# Patient Record
Sex: MALE | Race: WHITE | Employment: FULL TIME | URBAN - METROPOLITAN AREA
[De-identification: names, ages, dates, MRNs, and addresses within clinical notes are randomized per-mention and may not be internally consistent; named-entity substitution may affect disease eponyms.]

---

## 2017-12-22 PROBLEM — G47.33 OSA (OBSTRUCTIVE SLEEP APNEA): Status: ACTIVE | Noted: 2017-12-22

## 2019-12-11 PROCEDURE — 88305 TISSUE EXAM BY PATHOLOGIST: CPT | Performed by: INTERNAL MEDICINE

## 2020-02-25 PROBLEM — I70.0 AORTIC ATHEROSCLEROSIS (HCC): Status: ACTIVE | Noted: 2020-02-25

## 2020-02-25 PROBLEM — I70.0 AORTIC ATHEROSCLEROSIS: Status: ACTIVE | Noted: 2020-02-25

## 2020-10-01 PROBLEM — G89.29 CHRONIC RIGHT HIP PAIN: Status: ACTIVE | Noted: 2020-10-01

## 2020-10-01 PROBLEM — S73.191A TEAR OF RIGHT ACETABULAR LABRUM, INITIAL ENCOUNTER: Status: ACTIVE | Noted: 2020-10-01

## 2020-10-01 PROBLEM — M25.551 CHRONIC RIGHT HIP PAIN: Status: ACTIVE | Noted: 2020-10-01

## 2020-10-09 PROBLEM — R73.03 PREDIABETES: Status: ACTIVE | Noted: 2020-10-09

## 2021-09-29 PROBLEM — I10 ACCELERATED HYPERTENSION: Status: ACTIVE | Noted: 2021-09-29

## 2021-12-07 PROBLEM — Z28.21 REFUSED PNEUMOCOCCAL VACCINATION: Status: ACTIVE | Noted: 2021-12-07

## 2021-12-07 PROBLEM — N18.31 STAGE 3A CHRONIC KIDNEY DISEASE (HCC): Status: ACTIVE | Noted: 2021-12-07

## 2024-02-07 ENCOUNTER — HOSPITAL ENCOUNTER (OUTPATIENT)
Dept: GENERAL RADIOLOGY | Facility: HOSPITAL | Age: 69
Discharge: HOME | End: 2024-02-07
Attending: ORTHOPAEDIC SURGERY
Payer: MEDICARE

## 2024-02-07 ENCOUNTER — OFFICE VISIT (OUTPATIENT)
Dept: ORTHOPEDICS CLINIC | Facility: CLINIC | Age: 69
End: 2024-02-07
Payer: MEDICARE

## 2024-02-07 VITALS — WEIGHT: 236.63 LBS | BODY MASS INDEX: 32.05 KG/M2 | HEIGHT: 72 IN

## 2024-02-07 DIAGNOSIS — M25.559 HIP PAIN, UNSPECIFIED LATERALITY: ICD-10-CM

## 2024-02-07 DIAGNOSIS — M16.11 PRIMARY OSTEOARTHRITIS OF RIGHT HIP: ICD-10-CM

## 2024-02-07 DIAGNOSIS — M25.559 HIP PAIN, UNSPECIFIED LATERALITY: Primary | ICD-10-CM

## 2024-02-07 PROCEDURE — 99204 OFFICE O/P NEW MOD 45 MIN: CPT | Performed by: ORTHOPAEDIC SURGERY

## 2024-02-07 PROCEDURE — 73502 X-RAY EXAM HIP UNI 2-3 VIEWS: CPT | Performed by: ORTHOPAEDIC SURGERY

## 2024-02-07 RX ORDER — MELOXICAM 15 MG/1
15 TABLET ORAL DAILY
Qty: 30 TABLET | Refills: 0 | Status: SHIPPED | OUTPATIENT
Start: 2024-02-07

## 2024-02-07 NOTE — PROGRESS NOTES
NURSING INTAKE COMMENTS:   Chief Complaint   Patient presents with    Hip Pain     New pt - R hip - onset- 5 yrs ago- denies injury- rates pain 5-6/10 on and off- here for 2nd opinion        HPI: This 68 year old male presents today with complaints of pain in the right hip.  Has had the pain for several years.  It interferes with his quality of life.  He walks his dog about half a mile at night.  He finds that he does not walk as far as he would like because his hip hurts.  He has used meloxicam in the past with good relief.  He is tried turmeric recently without success.  He has had 2 cortisone's, 1 in the anterior aspect of the hip which he thinks helped, and the second on the lateral aspect of the hip which she thinks did not help.  He saw another surgeon who told him that if he had a hip replacement he would not have any pain.  He wanted to get a second opinion.  He would like to try conservative treatment if appropriate.    Past Medical History:   Diagnosis Date    High blood pressure     High cholesterol     HYPERLIPIDEMIA     Obstructive sleep apnea syndrome PSG 9/27/15    AHI 26 REM AHI 48 O2 [place] 84%    OTHER DISEASES     congenital hand defect     Past Surgical History:   Procedure Laterality Date    COLONOSCOPY  2004?    colon polyps    COLONOSCOPY,BIOPSY  10/5/2011    Performed by CORY RODRIGUEZ at [facility] [place], [facility]    EXCIS TENDON SHEATH CHIKIS ROGERS/SOLANGE  11/13/09    Performed by LEVAR HARRELL at [facility], [facility]    REMOVAL OF LUNG,LOBECTOMY  2016    nodule removed      Current Outpatient Medications   Medication Sig Dispense Refill    Meloxicam 15 MG Oral Tab Take 1 tablet (15 mg total) by mouth daily. 30 tablet 0    VALSARTAN 160 MG Oral Tab TAKE ONE TABLET BY MOUTH ONE TIME DAILY 90 tablet 0    ATORVASTATIN 20 MG Oral Tab TAKE ONE TABLET BY MOUTH ONE TIME DAILY  90 tablet 3    aspirin 81 MG Oral Tab Take 81 mg by mouth daily.      Cholecalciferol (VITAMIN D) 2000 UNITS Oral Cap  Take 2,000 Units by mouth daily.       No Known Allergies  Family History   Problem Relation Age of Onset    Cancer Father     Other (Other) Mother         MS    Lipids Maternal Grandfather     Heart Disorder Maternal Grandfather         cad    Cancer Sister         brain tumor age 42       Social History     Occupational History    Not on file   Tobacco Use    Smoking status: Former     Packs/day: 1.00     Years: 45.00     Additional pack years: 0.00     Total pack years: 45.00     Types: Cigarettes     Quit date: 2015     Years since quittin.4    Smokeless tobacco: Never    Tobacco comments:     9/1/15 - hasn't smoked for a few days   Vaping Use    Vaping Use: Never used   Substance and Sexual Activity    Alcohol use: Yes     Alcohol/week: 3.0 standard drinks of alcohol     Types: 3 Cans of beer per week     Comment: occ 2 beers a week     Drug use: Yes     Types: Cannabis     Comment: marijuana once a month, smks     Sexual activity: Not Currently        Review of Systems:  GENERAL: feels generally well, no significant weight loss or weight gain  SKIN: no ulcerated or worrisome skin lesions  EYES:denies blurred vision or double vision  HEENT: denies new nasal congestion, sinus pain or ST  LUNGS: denies shortness of breath  CARDIOVASCULAR: denies chest pain  GI: no hematemesis, no worsening heartburn, no diarrhea  : no dysuria, no blood in urine, no difficulty urinating, no incontinence  MUSCULOSKELETAL: no other musculoskeletal complaints other than in HPI  NEURO: no numbness or tingling, no weakness or balance disorder  PSYCHE: no depression or anxiety  HEMATOLOGIC: no hx of blood dyscrasia  ENDOCRINE: no thyroid or diabetes issues  ALL/ASTHMA: no new hx of severe allergy or asthma    Physical Examination:    Ht 6' (1.829 m)   Wt 236 lb 9.6 oz (107.3 kg)   BMI 32.09 kg/m²   Constitutional: appears well hydrated, alert and responsive, no acute distress noted  Extremities: Normal gait.  Slightly  positive Stinchfield test on the right.  Full range of motion.      Imaging: Moderate osteoarthritis of the right hip with joint space narrowing and osteophyte formation..     Lab Results   Component Value Date    WBC 4.65 12/07/2021    HGB 15.6 12/07/2021     12/07/2021      Lab Results   Component Value Date     (H) 02/10/2022    BUN 18.0 02/10/2022    CREATSERUM 1.23 (H) 02/10/2022    GFR 58 (L) 12/04/2008        Assessment and Plan:  Diagnoses and all orders for this visit:    Hip pain, unspecified laterality  -     XR HIP W OR WO PELVIS 2 OR 3 VIEWS, RIGHT (CPT=73502); Future    Primary osteoarthritis of right hip  -     PHYSICAL THERAPY - INTERNAL  -     XR ASPIR/INJ MAJOR JOINT W/FLUORO RT(CPT=77002/09496); Future    Other orders  -     Meloxicam 15 MG Oral Tab; Take 1 tablet (15 mg total) by mouth daily.        Assessment: He has moderate osteoarthritis of the right hip.  I outlined a conservative treatment program for him which she will try prior to consideration of total hip arthroplasty.    Plan: Meloxicam, physical therapy, cortisone injection in the right hip, and I recommended weight reduction and suggested he download the weight watchers leonardo.  He will follow-up with me as needed.    The above note was creating using Dragon speech recognition technology. Please excuse any typos.    RONA BOYLE MD

## 2024-02-21 ENCOUNTER — TELEPHONE (OUTPATIENT)
Dept: PHYSICAL THERAPY | Facility: HOSPITAL | Age: 69
End: 2024-02-21

## 2024-02-26 ENCOUNTER — OFFICE VISIT (OUTPATIENT)
Dept: PHYSICAL THERAPY | Age: 69
End: 2024-02-26
Attending: ORTHOPAEDIC SURGERY
Payer: MEDICARE

## 2024-02-26 DIAGNOSIS — M16.11 PRIMARY OSTEOARTHRITIS OF RIGHT HIP: Primary | ICD-10-CM

## 2024-02-26 PROCEDURE — 97110 THERAPEUTIC EXERCISES: CPT | Performed by: PHYSICAL THERAPIST

## 2024-02-26 PROCEDURE — 97162 PT EVAL MOD COMPLEX 30 MIN: CPT | Performed by: PHYSICAL THERAPIST

## 2024-02-26 NOTE — PROGRESS NOTES
LOWER EXTREMITY EVALUATION:     Diagnosis:   Primary osteoarthritis of right hip (M16.11)       Referring Provider: Yaquelin  Date of Evaluation:    2/26/2024    Precautions:  None Next MD visit:   none scheduled  Date of Surgery: n/a     PATIENT SUMMARY   Ronnie Ordonez is a 68 year old male who presents to therapy today with complaints of R hip pain for the last few years - 10years and has been worsening.    said there was a labral tear R from an MRI taken through [facility] 3 years ago. No catching or clicking at the hip.  States does have clicking at the knee and sometimes will give out .  States taking MELOXICAM which helps in the day but nothing at night.  Getting a cortisone shot tomorrow.  Has had 2 shots in the R hip in the past - 2-3 years- one helped, on didn't.   Pt describes pain level current 1/10, at best 0/10, at worst 7/10 - mornings, walking up to 5-6 blocks.   Works part time -  - light to mod weight.   does feels some back pain when leaning to do dishes of getting something from low on the ground.   Current functional limitations include walking > 5-6 blocks, walking in the yard, first thing in morning - wakes up about 6 hours.., doing dishes some L side back pain/lifting from ground.     Ronnie describes prior level of function some limitation with walking starting 6 years ago and has worsened.  . Pt goals include 1. Get rid of the pain, walk further without the pain limiting him.  .  Past medical history was reviewed with Ronnie. Significant findings include nodule removed lung, meniscus tear surgery L - 7 years ago    ASSESSMENT  Ronnie presents to physical therapy evaluation with primary c/o R hip pain. The results of the objective tests and measures show impaired ROM R to L hip ER/IR, impaired glut strength R to L leg, noted less stability during single leg stance R to L, antalgic gait pattern. Impaired flexibility HS/gastrocs bilaterally and c/o pain.  Functional deficits  include but are not limited to walking > 5-6 blocks, am pain 7/10, doing dishes. .  Pt and PT discussed evaluation findings, pathology, POC and HEP.  Pt voiced understanding and performs HEP correctly without reported pain. Skilled Physical Therapy is medically necessary to address the above impairments and reach functional goals.     OBJECTIVE:   Observation: posture forward shoulder/head, increase thoracic kyphosis,   Palpation: no pain to palpation R greater trochanteric area.  No pain with P/A glides lower lumbar central/R or L.   Sensation: intact  Baseline:  no pain.   Flexion mild LOM - repeated motion - NE  Extension- mod LOM - NE  SBR/L equal bilaterally   ROT R/L some R hip pain with both motions.    AROM: (* denotes performed with pain)  Hip Knee Foot/Ankle   Flexion: R 120; L 120  Extension: R 10; L 10  Abduction: R 4 minus; L 4/5  ER: R 60; L 70  IR: R 20*; L 25 WNL    WNL         Flexibility:  Hip Flexor: R WNL, L WNL  Hamstrings: R -40; L -45  Piriformis: R no restriction ; L no restriction  Quads: R mild restriction; L mild restriction  Gastroc-soleus: R mod restriction ; L mod restriction    Strength/MMT: (* denotes performed with pain)  Hip Knee Foot/Ankle   Flexion: R 5*/5; L 5/5  Extension: R 5/5; L 5/5  Abduction: R 4 minus/5; L 4/5  ER: R 5/5; L 5/5  IR: R 5/5; L 5/5 Flexion: R 5/5; L 5/5  Extension: R 5/5; L 5/5    DF: R 5/5; L 5/5  PF: R 4/5; L 4/5  INV: R 5/5; L 5/5  EV: R 5/5; L 5/5  Great toe ext: R 5/5; L 5/5     Special tests:   (-) Fabres    Gait: pt ambulates on level ground with ER bilat hips during gait, decreased stance time R.  Impaired heel strike bilaterally  Balance: SLS: R 60 noted less stability sec, L 60 sec  Tandem stand L behind 60 sec,   R behind     Today’s Treatment and Response:   Pt education was provided on exam findings, treatment diagnosis, treatment plan, expectations, and prognosis. Patient was instructed in and issued a HEP for: bridges, HS stretch 2nd step,  single leg ER supine, side glut med    Charges: PT Eval Moderate Complexity, Ex 1      Total Timed Treatment: 45 min     Total Treatment Time: 45 min     Based on clinical rationale and outcome measures, this evaluation involved Moderate Complexity decision making due to 1-2 personal factors/comorbidities, 3 body structures involved/activity limitations, and evolving symptoms including changing pain levels.  PLAN OF CARE:    Goals: (to be met in 8-12 visits)  Patient to report pain 1-2/10 or less after walking 1 mile.   Patient to report pain in am to be 4/10 or less.   Patient to be independent with ongoing HEP.   Patient aware of correct body mechanics with daily activities.   Improve LEFS score by 10% or greater.     Frequency / Duration: Patient will be seen for 2 x/week or a total of 8-12 visits over a 90 day period. Treatment will include: Gait training, Neuromuscular Re-education, Therapeutic Exercise, and Home Exercise Program instruction    Education or treatment limitation: None  Rehab Potential:good    LEFS Score  LEFS Score: 47.5 % (2/22/2024  8:18 AM)      Patient was advised of these findings, precautions, and treatment options and has agreed to actively participate in planning and for this course of care.    Thank you for your referral. Please co-sign or sign and return this letter via fax as soon as possible to [phone]. If you have any questions, please contact me at Dept: [phone]    Sincerely,  Electronically signed by therapist: Rina Gunter, PT  Physician's certification required: Yes  I certify the need for these services furnished under this plan of treatment and while under my care.    X___________________________________________________ Date____________________    Certification From: 2/26/2024  To:5/26/2024

## 2024-02-27 ENCOUNTER — HOSPITAL ENCOUNTER (OUTPATIENT)
Dept: GENERAL RADIOLOGY | Facility: HOSPITAL | Age: 69
Discharge: HOME | End: 2024-02-27
Attending: ORTHOPAEDIC SURGERY
Payer: MEDICARE

## 2024-02-27 DIAGNOSIS — M16.11 PRIMARY OSTEOARTHRITIS OF RIGHT HIP: ICD-10-CM

## 2024-02-27 PROCEDURE — 20610 DRAIN/INJ JOINT/BURSA W/O US: CPT | Performed by: ORTHOPAEDIC SURGERY

## 2024-02-27 PROCEDURE — 77002 NEEDLE LOCALIZATION BY XRAY: CPT | Performed by: ORTHOPAEDIC SURGERY

## 2024-02-27 RX ORDER — BUPIVACAINE HYDROCHLORIDE 5 MG/ML
10 INJECTION, SOLUTION EPIDURAL; INTRACAUDAL ONCE
Status: COMPLETED | OUTPATIENT
Start: 2024-02-27 | End: 2024-02-27

## 2024-02-27 RX ORDER — LIDOCAINE HYDROCHLORIDE 10 MG/ML
INJECTION, SOLUTION EPIDURAL; INFILTRATION; INTRACAUDAL; PERINEURAL
Status: COMPLETED
Start: 2024-02-27 | End: 2024-02-27

## 2024-02-27 RX ORDER — LIDOCAINE HYDROCHLORIDE 10 MG/ML
5 INJECTION, SOLUTION EPIDURAL; INFILTRATION; INTRACAUDAL; PERINEURAL ONCE
Status: COMPLETED | OUTPATIENT
Start: 2024-02-27 | End: 2024-02-27

## 2024-02-27 RX ORDER — BUPIVACAINE HYDROCHLORIDE 5 MG/ML
INJECTION, SOLUTION EPIDURAL; INTRACAUDAL
Status: COMPLETED
Start: 2024-02-27 | End: 2024-02-27

## 2024-02-27 RX ORDER — TRIAMCINOLONE ACETONIDE 40 MG/ML
40 INJECTION, SUSPENSION INTRA-ARTICULAR; INTRAMUSCULAR ONCE
Status: COMPLETED | OUTPATIENT
Start: 2024-02-27 | End: 2024-02-27

## 2024-02-27 RX ORDER — TRIAMCINOLONE ACETONIDE 40 MG/ML
INJECTION, SUSPENSION INTRA-ARTICULAR; INTRAMUSCULAR
Status: COMPLETED
Start: 2024-02-27 | End: 2024-02-27

## 2024-02-27 RX ADMIN — TRIAMCINOLONE ACETONIDE 40 MG: 40 INJECTION, SUSPENSION INTRA-ARTICULAR; INTRAMUSCULAR at 11:15:00

## 2024-02-27 RX ADMIN — LIDOCAINE HYDROCHLORIDE 5 ML: 10 INJECTION, SOLUTION EPIDURAL; INFILTRATION; INTRACAUDAL; PERINEURAL at 11:15:00

## 2024-02-27 RX ADMIN — BUPIVACAINE HYDROCHLORIDE 4 ML: 5 INJECTION, SOLUTION EPIDURAL; INTRACAUDAL at 11:15:00

## 2024-02-28 ENCOUNTER — OFFICE VISIT (OUTPATIENT)
Dept: PHYSICAL THERAPY | Age: 69
End: 2024-02-28
Attending: ORTHOPAEDIC SURGERY
Payer: MEDICARE

## 2024-02-28 PROCEDURE — 97110 THERAPEUTIC EXERCISES: CPT | Performed by: PHYSICAL THERAPIST

## 2024-02-28 NOTE — PROGRESS NOTES
Diagnosis:   Primary osteoarthritis of right hip (M16.11)         Referring Provider: Yaquelin  Date of Evaluation:    2/26/2024    Precautions:  None Next MD visit:   none scheduled  Date of Surgery: n/a   Insurance Primary/Secondary: [employer]UARD [facility] / N/A     # Auth Visits: 2            Subjective: Patient reports had injection yesterday in hip.  States id not do any exercises yesterday due to having the injection at his hip.     Pain: 0/10      Objective: See outpatient daily record.      Assessment: Added additional strengthening exercises and flexibility exercises with patient this session.   Patient without complaints this session.       Goals:   Goals: (to be met in 8-12 visits)  Patient to report pain 1-2/10 or less after walking 1 mile.   Patient to report pain in am to be 4/10 or less.   Patient to be independent with ongoing HEP.   Patient aware of correct body mechanics with daily activities.   Improve LEFS score by 10% or greater.     Plan: Continue Flexibility, strengthening, balance work, advance as able.   Date: 2/28/2024  TX#: 2/8-12   Nu step x 5 minutes level 5   Stretches HS at step 2 x 45 sec  Stretches RF at step 1 x 45 sec each  Bridges 2 x 10  Supine hip ER x 10 each  Side glut med 2 x 10- hold 3 sec  Standing hip abd x 10 R/L YTB  Standing hip ext x 10 R/L YTB  Sit to stand x 10 reps  Leg swing L on step x 45 seconds.  Forward step ups x 10          HEP: standing hip ext/abd, sit to stand, frwd step up, leg swings  Charges: Ex 3       Total Timed Treatment: 38 min  Total Treatment Time: 40 min

## 2024-03-04 ENCOUNTER — OFFICE VISIT (OUTPATIENT)
Dept: PHYSICAL THERAPY | Age: 69
End: 2024-03-04
Attending: ORTHOPAEDIC SURGERY
Payer: MEDICARE

## 2024-03-04 PROCEDURE — 97110 THERAPEUTIC EXERCISES: CPT | Performed by: PHYSICAL THERAPIST

## 2024-03-04 NOTE — PROGRESS NOTES
Diagnosis:   Primary osteoarthritis of right hip (M16.11)         Referring Provider: Yaquelin  Date of Evaluation:    2/26/2024    Precautions:  None Next MD visit:   none scheduled  Date of Surgery: n/a   Insurance Primary/Secondary: EDUARD [facility] / N/A     # Auth Visits: 3            Subjective: Patient reports was able to walk a mile yesterday with pain at 3/10 at end, not bad.  Was able to do the exercises and felt areas were tight.     Pain: 0/10      Objective: See outpatient daily record.      Assessment: Added flexion hip in standing YTB, single leg balance and side step ups.  Patient able to complete all without increase in symptoms.         Goals:   Goals: (to be met in 8-12 visits)  Patient to report pain 1-2/10 or less after walking 1 mile.   Patient to report pain in am to be 4/10 or less.   Patient to be independent with ongoing HEP.   Patient aware of correct body mechanics with daily activities.   Improve LEFS score by 10% or greater.     Plan: Continue Flexibility, strengthening, balance work, advance as able.   Date: 3/4/2024  TX#: 3/8-12   Nu step x 6 minutes level 6   Stretches HS at step 2 x 45 sec  Forward step ups x 15 6\" R/L   Stretches RF at step 1 x 45 sec each  Leg swing L on step x 1 min  Single leg balance 2 x 15 sec each leg  Standing hip abd x 12 R/L YTB  Standing hip ext x 12 R/L YTB  Standing hip flex x 10 each YTB R/L  Bridges 2 x 10  Supine hip ER x 10 each  Side glut med 2 x 12- hold 3 sec  Sit to stand x 10 reps  Side step ups x 10 R/L             HEP: SLB, standing hip flex YTB, side step ups  Charges: Ex 3       Total Timed Treatment: 40 min  Total Treatment Time: 42 min

## 2024-03-07 ENCOUNTER — OFFICE VISIT (OUTPATIENT)
Dept: PHYSICAL THERAPY | Age: 69
End: 2024-03-07
Attending: ORTHOPAEDIC SURGERY
Payer: MEDICARE

## 2024-03-07 PROCEDURE — 97110 THERAPEUTIC EXERCISES: CPT | Performed by: PHYSICAL THERAPIST

## 2024-03-07 NOTE — PROGRESS NOTES
Diagnosis:   Primary osteoarthritis of right hip (M16.11)         Referring Provider: Yaquelin  Date of Evaluation:    2/26/2024    Precautions:  None Next MD visit:   none scheduled  Date of Surgery: n/a   Insurance Primary/Secondary: EDUARD [facility] / N/A     # Auth Visits: 4            Subjective: Patient reports was sore on Tuesday 3-4/10 but today feels ok.  Did most of the  exercises and walking yesterday and no increase in pain today.    Pain: 1/10      Objective: See outpatient daily record.      Assessment: completed exercises patient did not do previous day.  Added balance work in department.  Noted instability both LE's. Patient without c/o change in symptoms end of session.         Goals:   Goals: (to be met in 8-12 visits)  Patient to report pain 1-2/10 or less after walking 1 mile.   Patient to report pain in am to be 4/10 or less.   Patient to be independent with ongoing HEP.   Patient aware of correct body mechanics with daily activities.   Improve LEFS score by 10% or greater.     Plan: Continue Flexibility, strengthening, balance work, advance as able.   Date: 3/7/2024  TX#: 4/8-12   Nu step x 6 minutes level 6   Stretches HS at step 2 x 45 sec  Side step ups x 15 6\" R/L   Stretches RF at step 2 x 45 sec each  Leg swing L on step x 1 min  Single leg balance 2 x 15 sec each leg  Standing hip flex x 10 each YTB R/L  Tandem stand floor 2 x 30 sec  Tandem stand air ex x 30 sec each  Wobble board a/p/s/s and work on balance of board  Single leg stance 2 x 20 sec each  Step up R/L x 5 each frwd air ex  Side step air ex x 5 each  Standing feet together on air ex with single and double arm reaches.   Supine hip ER x 8 each  Sit to stand x 10 reps               HEP: no changes.   Charges: Ex 3       Total Timed Treatment: 42 min  Total Treatment Time: 42 min

## 2024-03-11 ENCOUNTER — OFFICE VISIT (OUTPATIENT)
Dept: PHYSICAL THERAPY | Age: 69
End: 2024-03-11
Attending: ORTHOPAEDIC SURGERY
Payer: MEDICARE

## 2024-03-11 PROCEDURE — 97110 THERAPEUTIC EXERCISES: CPT

## 2024-03-11 NOTE — PROGRESS NOTES
Diagnosis:   Primary osteoarthritis of right hip (M16.11)         Referring Provider: Yaquelin  Date of Evaluation:    2/26/2024    Precautions:  None Next MD visit:   none scheduled  Date of Surgery: n/a   Insurance Primary/Secondary: EDUARD [facility] / N/A     # Auth Visits: 5           Subjective: Patient reports that he's feeling good today.  It has only been getting bad if he does too much, walks too much or walks on uneven ground.    Pain: 1/10 current      Objective: See outpatient daily record.      Assessment:   Continued with balance exercises especially on uneven surfaces to improve his stability.  He tolerated all ther ex well and no increased pain at end of treatment.        Goals:   Goals: (to be met in 8-12 visits)  Patient to report pain 1-2/10 or less after walking 1 mile.   Patient to report pain in am to be 4/10 or less.   Patient to be independent with ongoing HEP.   Patient aware of correct body mechanics with daily activities.   Improve LEFS score by 10% or greater.     Plan: Continue Flexibility, strengthening, balance work, advance as able.   Date: 3/11/2024  TX#: 5/8-12   Nu step x 6 minutes level 6   Side step ups x 15 6\" R/L  Stretches HS at step 2 x 45 sec  Stretches RF at step 2 x 45 sec each  Right leg swing L on step x 1 min  Standing hip flex x 10 each YTB R/L  Standing hip abd x 10 each YTB R/L  Standing hip ext x 10 each YTB R/L   Tandem stand floor 1 x 30 sec  Tandem stand air ex 2 x 30 sec each  Tandem walk at bar x 2 laps  Wobble board a/p/s/s and work on balance of board  Single leg stance 1 x 20 sec each  Single leg stance on Airex 2 x 10 sec each  Step up R/L x 10 each frwd air ex  Side step air ex x 10 each  Standing feet together on air ex with single and double arm reaches x 10 each  Sit to stand x 10 reps               HEP: no changes.   Charges: Ex 3       Total Timed Treatment: 43 min  Total Treatment Time: 43 min

## 2024-03-18 RX ORDER — MELOXICAM 15 MG/1
15 TABLET ORAL DAILY
Qty: 30 TABLET | Refills: 0 | Status: SHIPPED | OUTPATIENT
Start: 2024-03-18

## 2024-03-18 NOTE — TELEPHONE ENCOUNTER
Pt sent MyChart Refill Request on 3/12/24 for Meloxicam 15 mg.  Last prescribed on 2/7/24, #30, no refills.  LOV 2/7/24.  Do you want to approve?

## 2024-03-21 ENCOUNTER — OFFICE VISIT (OUTPATIENT)
Dept: PHYSICAL THERAPY | Age: 69
End: 2024-03-21
Attending: ORTHOPAEDIC SURGERY
Payer: MEDICARE

## 2024-03-21 PROCEDURE — 97110 THERAPEUTIC EXERCISES: CPT | Performed by: PHYSICAL THERAPIST

## 2024-03-21 NOTE — PROGRESS NOTES
Diagnosis:   Primary osteoarthritis of right hip (M16.11)         Referring Provider: Dr. Jamison  Date of Evaluation:    2/26/2024    Precautions:  None Next MD visit:   none scheduled  Date of Surgery: n/a   Insurance Primary/Secondary: EDUARD [facility] / N/A     # Auth Visits: 6           Subjective: Patient reports he continues with the walking. States walking 1 block it gets sore then after loosens up and continues on a mile.  End of mile 4/10.  This goes away with sitting for 10-15 min.  States since shot it takes more activity to get to the pain now.  States is sleeping ok and doesn't have to get up and move around due to pain in the morning, can roll over and just go back to sleep.   Pain: 0/10 current      Objective: See outpatient daily record.      Assessment:   Patient with some fatigue noted end of session at R hip from start of session. Added shuttle for leg strengthening this session.  Continued balance work and LE strengthening/flexibility work      Goals:   Goals: (to be met in 8-12 visits)  Patient to report pain 1-2/10 or less after walking 1 mile.   Patient to report pain in am to be 4/10 or less.   Patient to be independent with ongoing HEP.   Patient aware of correct body mechanics with daily activities.   Improve LEFS score by 10% or greater.     Plan: Continue Flexibility, strengthening, balance work, advance as able.   Date: 3/21/2024  TX#: 6/8-12   Nu step x 7 minutes level 6  Right leg swing L on step x 1 min- 3# weight  HS stretch at step 45 sec x 2   Side step ups x 15 6\" R/L  Stretches RF at step 2 x 45 sec each  Standing hip flex x 10 each YTB R/L  Standing hip abd x 10 each YTB R/L  Standing hip ext x 10 each YTB R/L   Tandem stand floor  x 30 sec  Tandem stand air ex 2 x 30 sec each  Step over air ex x 12 each leg  SLB R 3 x 10 seconds  Wobble board a/p/s/s x 10, then  work on balance of board- 45 sec each direction  Single leg stance 1 x 20 sec each  Single leg stance on Airex 2 x 10 sec  each  Side step air ex x 10 each  Standing feet together on air ex with single and double arm reaches x 10 each  Shuttle leg press double 6B x 15  Shuttle leg press single  4B x 12 each  Sit to stand x 10 reps               HEP: no changes.   Charges: Ex 3       Total Timed Treatment: 43 min  Total Treatment Time: 43 min

## 2024-03-25 ENCOUNTER — OFFICE VISIT (OUTPATIENT)
Dept: PHYSICAL THERAPY | Age: 69
End: 2024-03-25
Attending: ORTHOPAEDIC SURGERY
Payer: MEDICARE

## 2024-03-25 PROCEDURE — 97110 THERAPEUTIC EXERCISES: CPT | Performed by: PHYSICAL THERAPIST

## 2024-03-25 NOTE — PROGRESS NOTES
Diagnosis:   Primary osteoarthritis of right hip (M16.11)         Referring Provider: Dr. Jamison Date of Evaluation:    2/26/2024    Precautions:  None Next MD visit:   none scheduled  Date of Surgery: n/a   Insurance Primary/Secondary: EDUARD [facility] / N/A     # Auth Visits: 7          Subjective: Patient reports after walking the mile pain my be 2-4/10 levels.  State morning is around 2/10. 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 States did the stretches over the weekend and the step up frwd.    Pain: 0/10 current      Objective: See outpatient daily record.      Assessment:   Patient with some slight increase in pain end of session.        Goals:   Goals: (to be met in 8-12 visits)  Patient to report pain 1-2/10 or less after walking 1 mile.     Patient to report pain in am to be 4/10 or less.   Patient to be independent with ongoing HEP.   Patient aware of correct body mechanics with daily activities.   Improve LEFS score by 10% or greater.     Plan: Continue Flexibility, strengthening, balance work, advance as able.  Reorganize HEP next session.   Date: 3/25/2024  TX#: 7/8-12   Nu step x 7 minutes level 6  Right leg swing L on step x 1 min- 3# weight  HS stretch at step 45 sec x 2   Side step ups x 10 6\" R/L  Stretches RF at step 2 x 45 sec each  Standing hip flex x 10 each YTB R/L  Standing hip abd x 10 each YTB R/L  Standing hip ext x 10 each YTB R/L   Tandem stand floor  x 30 sec  Tandem stand air ex 2 x 30 sec each  Shuttle leg press double 6B x 15  Shuttle leg press single  4B x 12 each  Side glut med 2 x 10  Bridges 2 x 10  SLR x 10 each leg               HEP: no changes.   Charges: Ex 3       Total Timed Treatment: 40 min  Total Treatment Time: 40 min

## 2024-03-27 ENCOUNTER — OFFICE VISIT (OUTPATIENT)
Dept: PHYSICAL THERAPY | Age: 69
End: 2024-03-27
Attending: ORTHOPAEDIC SURGERY
Payer: MEDICARE

## 2024-03-27 PROCEDURE — 97110 THERAPEUTIC EXERCISES: CPT | Performed by: PHYSICAL THERAPIST

## 2024-03-27 NOTE — PROGRESS NOTES
Diagnosis:   Primary osteoarthritis of right hip (M16.11)         Referring Provider: Dr. Jamison Date of Evaluation:    2/26/2024    Precautions:  None Next MD visit:   none scheduled  Date of Surgery: n/a   Insurance Primary/Secondary: [employer] [facility] / N/A     # Auth Visits: [id number]         Subjective: Patient reports some light stiffness this morning.  States feels he is able to continue on his own at this time and its up to him to complete the program.    Pain: 1/10 current      Objective: See outpatient daily record.  LEFS initially  38/80      Presently 56/80    Assessment: Patient has met 4/5 goals set and has shown improvement with LEFS score from initial session.  Patient is independent with an ongoing HEP and can continue on his own.      Goals:   Goals: (to be met in 8-12 visits)  Patient to report pain 1-2/10 or less after walking 1 mile.   ongoing  Patient to report pain in am to be 4/10 or less.  Met 3/10  Patient to be independent with ongoing HEP. - met  Patient aware of correct body mechanics with daily activities.  met  Improve LEFS score by 10% or greater. - met    Plan: Discharge to independent HEP at this time.  Date: 3/27/2024  TX#: 8/8-12   Nu step x 7 minutes level 6  Right leg swing L on step x 1 min- 5# weight  HS stretch at step 45 sec x 2   Side step ups x 15 6\" R/L  Stretches RF at step 2 x 45 sec each  Standing hip flex x 10 each YTB R/L  Standing hip abd x 10 each YTB R/L  Standing hip ext x 10 each YTB R/L   Tandem stand floor  x 30 sec  Tandem stand air ex 2 x 30 sec each  Shuttle leg press double 6B x 15  Shuttle leg press single  4B x 10 each  Side glut med 2 x 10  Bridges 2 x 10  SLR x 10 each leg               HEP: no changes.   Charges: Ex 3       Total Timed Treatment: 40 min  Total Treatment Time: 40 min

## 2024-03-28 ENCOUNTER — APPOINTMENT (OUTPATIENT)
Dept: PHYSICAL THERAPY | Age: 69
End: 2024-03-28
Attending: ORTHOPAEDIC SURGERY
Payer: MEDICARE

## 2024-04-01 ENCOUNTER — APPOINTMENT (OUTPATIENT)
Dept: PHYSICAL THERAPY | Age: 69
End: 2024-04-01
Attending: ORTHOPAEDIC SURGERY
Payer: MEDICARE

## 2024-04-03 ENCOUNTER — APPOINTMENT (OUTPATIENT)
Dept: PHYSICAL THERAPY | Age: 69
End: 2024-04-03
Attending: ORTHOPAEDIC SURGERY
Payer: MEDICARE

## 2024-04-08 ENCOUNTER — APPOINTMENT (OUTPATIENT)
Dept: PHYSICAL THERAPY | Age: 69
End: 2024-04-08
Attending: ORTHOPAEDIC SURGERY
Payer: MEDICARE

## 2024-04-10 ENCOUNTER — APPOINTMENT (OUTPATIENT)
Dept: PHYSICAL THERAPY | Age: 69
End: 2024-04-10
Attending: ORTHOPAEDIC SURGERY
Payer: MEDICARE

## 2024-04-15 ENCOUNTER — APPOINTMENT (OUTPATIENT)
Dept: PHYSICAL THERAPY | Age: 69
End: 2024-04-15
Attending: ORTHOPAEDIC SURGERY
Payer: MEDICARE

## 2024-04-17 ENCOUNTER — APPOINTMENT (OUTPATIENT)
Dept: PHYSICAL THERAPY | Age: 69
End: 2024-04-17
Attending: ORTHOPAEDIC SURGERY
Payer: MEDICARE

## 2024-04-22 ENCOUNTER — APPOINTMENT (OUTPATIENT)
Dept: PHYSICAL THERAPY | Age: 69
End: 2024-04-22
Attending: ORTHOPAEDIC SURGERY
Payer: MEDICARE

## 2024-04-24 ENCOUNTER — APPOINTMENT (OUTPATIENT)
Dept: PHYSICAL THERAPY | Age: 69
End: 2024-04-24
Attending: ORTHOPAEDIC SURGERY
Payer: MEDICARE

## 2024-05-13 RX ORDER — MELOXICAM 15 MG/1
15 TABLET ORAL DAILY
Qty: 30 TABLET | Refills: 0 | Status: SHIPPED | OUTPATIENT
Start: 2024-05-13

## 2025-01-28 ENCOUNTER — HOSPITAL ENCOUNTER (OUTPATIENT)
Age: 70
Discharge: HOME | End: 2025-01-28
Attending: STUDENT IN AN ORGANIZED HEALTH CARE EDUCATION/TRAINING PROGRAM
Payer: MEDICARE

## 2025-01-28 VITALS
DIASTOLIC BLOOD PRESSURE: 73 MMHG | SYSTOLIC BLOOD PRESSURE: 158 MMHG | RESPIRATION RATE: 20 BRPM | TEMPERATURE: 99 F | OXYGEN SATURATION: 100 % | HEART RATE: 71 BPM

## 2025-01-28 DIAGNOSIS — U07.1 COVID: Primary | ICD-10-CM

## 2025-01-28 DIAGNOSIS — J06.9 VIRAL URI WITH COUGH: ICD-10-CM

## 2025-01-28 PROCEDURE — 99212 OFFICE O/P EST SF 10 MIN: CPT

## 2025-01-28 PROCEDURE — 99203 OFFICE O/P NEW LOW 30 MIN: CPT

## 2025-01-28 NOTE — ED PROVIDER NOTES
Patient Seen in: [facility] [place]      History     Chief Complaint   Patient presents with    Medication Request     Stated Complaint: Covid Testing    Subjective:   HPI    69-year-old male with past medical history of HTN on valsartan, HLD on atorvastatin, and stage IIIa CKD per chart review, who presents with concern for 3 days of cough, nasal congestion, and fatigue.  He states he tested positive for COVID.  His wife has similar symptoms and started Paxlovid.  He presents to discuss Paxlovid prescription.  He has been taking DayQuil and NyQuil and feels he is improving.  No shortness of breath or difficulty breathing.    Objective:     No pertinent past medical history.            No pertinent past surgical history.              No pertinent social history.            Review of Systems    Positive for stated complaint: Covid Testing  Other systems are as noted in HPI.  Constitutional and vital signs reviewed.      All other systems reviewed and negative except as noted above.    Physical Exam     ED Triage Vitals [01/28/25 1238]   /73   Pulse 71   Resp 20   Temp 99.4 °F (37.4 °C)   Temp src Oral   SpO2 100 %   O2 Device None (Room air)       Current Vitals:   Vital Signs  BP: 158/73  Pulse: 71  Resp: 20  Temp: 99.4 °F (37.4 °C)  Temp src: Oral    Oxygen Therapy  SpO2: 100 %  O2 Device: None (Room air)        Physical Exam    General: Awake, alert, comfortable on room air, in no distress, tolerating oral secretions, interactive  Pulmonary: Lungs CTA B, no wheezing, no conversational dyspnea  Neuro: Symmetrical facial expressions on gross observation  HEENT: No periorbital edema or erythema, nonerythematous and nonedematous intact B/L TMs, no erythema or edema of the B/L ear canals, nonerythematous and nonedematous B/L tonsils, no tonsillar exudates, no peritonsillar edema, uvula midline, tolerating oral secretions, normal speech, no submandibular edema  Psych: Normal mood, normal affect    ED Course    No labs or imaging performed  MDM   Patient is awake, alert, comfortable on room air, in no distress, afebrile, lungs CTAB with no wheezing with no sign of acute bronchospasm, no sign of otitis media or otitis externa, no sign of tonsillitis or PTA or deep space infection, he tested positive for COVID at home and would like to discuss Paxlovid prescription, but reports he has been improving, his symptoms of cough and nasal congestion are consistent with Viral URI with cough  -We discussed the potential benefits and side effects as well as risks of Paxlovid.  The only medication interaction is Valsartan but per review on up-to-date no action is required.  However, per chart review, patient appears to have stage IIIa CKD.  -Given patient is improving without intervention and has a normal exam, we discussed my concern that the risks of Paxlovid currently outweigh the benefits given his history of CKD, and therefore would not prescribe Paxlovid  -We discussed symptomatic management for viral infections with rest, hydration, and  patient states he has been using over-the-counter medication if needed      Disposition and Plan     Clinical Impression:  1. COVID    2. Viral URI with cough         Disposition:  Discharge  1/28/2025  1:08 pm    Follow-up:  Nicolas Ruiz MD  [address] [place] [address]  [phone]    In 3 days  As needed, If symptoms worsen          Medications Prescribed:  Current Discharge Medication List          None

## 2025-01-28 NOTE — DISCHARGE INSTRUCTIONS
You have COVID which is a viral infection.      At this time your exam and evaluation are consistent with a viral infection. Viral infections do not respond to antibiotics and are treated symptomatically. Ensure to rest and maintain hydration. Viral infections can progress and can lead to bacterial infections. If you develop any new, progressing, changing, or worsening signs or symptoms, please present immediately to your primary care physician for reassessment. If you develop any difficulty breathing, chest pain, shortness of breath, difficulty swallowing, drooling, signs or symptoms of dehydration, or any other concerning symptoms, call 911 or present immediately to the emergency department.       Your COVID test was positive at home.  As per the current, updated Center for Disease Control guidelines, \"People who test positive for COVID-19 should follow CDC guidance for preventing spread of respiratory viruses when sick. People with a respiratory virus should stay away from others until at least 24 hours after both symptoms are improving overall and they have not had a fever without the use of fever-reducing medication.  For 5 additional days, people should consider added precautions such as hygiene, use of masks, physical distancing, and steps for  air.\"